# Patient Record
Sex: MALE | Race: WHITE | NOT HISPANIC OR LATINO | Employment: UNEMPLOYED | URBAN - METROPOLITAN AREA
[De-identification: names, ages, dates, MRNs, and addresses within clinical notes are randomized per-mention and may not be internally consistent; named-entity substitution may affect disease eponyms.]

---

## 2017-06-12 ENCOUNTER — TELEPHONE (OUTPATIENT)
Dept: CARDIOTHORACIC SURGERY | Facility: CLINIC | Age: 57
End: 2017-06-12

## 2017-06-12 NOTE — TELEPHONE ENCOUNTER
Received a referral from Dr. Reno in [place], [place] re: paratracheal cyst. When pt first presented, it was believed he had a Zenker's diverticulum. U/S of the neck on 4/20/17, CT neck on 4/26/17, and EGD performed on 5/26/17.   CT neck revealed a 3.8x4.3x3.7cm air cyst at the level of the thoracic inlet.   Spoke with Dr. Reno's nurse, Jacey, Dr. Espinosa is also requesting a CT chest. Pt can  CT neck and CT chest on CD's and we can see him after the CT chest is obtained. Pt has no insurance on file and will be a self pay.   Jacey will call me back once CT chest is set up.

## 2017-06-14 ENCOUNTER — TELEPHONE (OUTPATIENT)
Dept: CARDIOTHORACIC SURGERY | Facility: CLINIC | Age: 57
End: 2017-06-14

## 2017-06-14 NOTE — TELEPHONE ENCOUNTER
Jacey with Dr. Reno faxed reports of CT neck and chest. CT chest performed yesterday, 6/13/17. Attempted to reach the pt on both provided phone #'s. Left a vm on on home phone # [phone].  Once appt is scheduled, will notify Jacey of the scheduled appt. Will also request the pt brings both CT chest and neck on CD.

## 2017-06-16 ENCOUNTER — TELEPHONE (OUTPATIENT)
Dept: CARDIOTHORACIC SURGERY | Facility: CLINIC | Age: 57
End: 2017-06-16

## 2017-06-16 NOTE — TELEPHONE ENCOUNTER
Diya spoke with pt's mother in regards to scheduling pt's consult with Dr. Espinosa on 6/21/17. Mailed appt slip along with campus map. Left a VM with Dr. Reno's nurse, Jacey ([phone]), to notify her of the scheduled appt.

## 2017-06-21 ENCOUNTER — OFFICE VISIT (OUTPATIENT)
Dept: CARDIOTHORACIC SURGERY | Facility: CLINIC | Age: 57
End: 2017-06-21
Payer: MEDICAID

## 2017-06-21 ENCOUNTER — TELEPHONE (OUTPATIENT)
Dept: GASTROENTEROLOGY | Facility: CLINIC | Age: 57
End: 2017-06-21

## 2017-06-21 ENCOUNTER — TELEPHONE (OUTPATIENT)
Dept: ENDOSCOPY | Facility: HOSPITAL | Age: 57
End: 2017-06-21

## 2017-06-21 VITALS
OXYGEN SATURATION: 98 % | BODY MASS INDEX: 19.16 KG/M2 | DIASTOLIC BLOOD PRESSURE: 77 MMHG | SYSTOLIC BLOOD PRESSURE: 129 MMHG | WEIGHT: 133.81 LBS | HEIGHT: 70 IN | HEART RATE: 118 BPM

## 2017-06-21 DIAGNOSIS — R63.4 WEIGHT LOSS, ABNORMAL: ICD-10-CM

## 2017-06-21 DIAGNOSIS — C15.9 MALIGNANT NEOPLASM OF ESOPHAGUS, UNSPECIFIED LOCATION: Primary | ICD-10-CM

## 2017-06-21 DIAGNOSIS — F17.210 CIGARETTE SMOKER: ICD-10-CM

## 2017-06-21 DIAGNOSIS — J98.59 MEDIASTINAL MASS: ICD-10-CM

## 2017-06-21 DIAGNOSIS — R13.19 ESOPHAGEAL DYSPHAGIA: Primary | ICD-10-CM

## 2017-06-21 DIAGNOSIS — R91.8 HILAR MASS: ICD-10-CM

## 2017-06-21 PROCEDURE — 99213 OFFICE O/P EST LOW 20 MIN: CPT | Mod: PBBFAC | Performed by: THORACIC SURGERY (CARDIOTHORACIC VASCULAR SURGERY)

## 2017-06-21 PROCEDURE — 99204 OFFICE O/P NEW MOD 45 MIN: CPT | Mod: S$PBB,,, | Performed by: THORACIC SURGERY (CARDIOTHORACIC VASCULAR SURGERY)

## 2017-06-21 PROCEDURE — 99999 PR PBB SHADOW E&M-EST. PATIENT-LVL III: CPT | Mod: PBBFAC,,, | Performed by: THORACIC SURGERY (CARDIOTHORACIC VASCULAR SURGERY)

## 2017-06-21 RX ORDER — LISINOPRIL 10 MG/1
10 TABLET ORAL EVERY MORNING
COMMUNITY

## 2017-06-21 RX ORDER — HYDROCODONE BITARTRATE AND ACETAMINOPHEN 7.5; 325 MG/1; MG/1
1 TABLET ORAL EVERY 6 HOURS PRN
Status: ON HOLD | COMMUNITY
End: 2017-06-22 | Stop reason: SDUPTHER

## 2017-06-21 NOTE — LETTER
June 21, 2017        Kingsley Reno MD  [address]  [place] [address]             [facility]  [facility]  [place] [address]  Phone: [phone]  Fax: [phone]   Patient: Segundo Jacobo   MR Number: [id number]   YOB: 1960   Date of Visit: 6/21/2017       Dear Dr. Reno:    Dictation #1  MRN:[id number]  CSN:[id number]      Sincerely,      Anderson Espinosa MD            CC  No Recipients    Enclosure

## 2017-06-21 NOTE — TELEPHONE ENCOUNTER
Spoke with patient and mother. EGD with stent/EUS scheduled for tomorrow 6/22 at 10:30a. It is an urgent add-on cleared by [facility]. Please call at [phone].

## 2017-06-21 NOTE — PROGRESS NOTES
Distress Screening Results: Psychosocial Distress screening score of Distress Score: 10 noted and reviewed. No intervention indicated.  Pt doesn't have a cancer diagnosis yet.
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
## 2017-06-22 ENCOUNTER — SURGERY (OUTPATIENT)
Age: 57
End: 2017-06-22

## 2017-06-22 ENCOUNTER — ANESTHESIA (OUTPATIENT)
Dept: ENDOSCOPY | Facility: HOSPITAL | Age: 57
End: 2017-06-22
Payer: MEDICAID

## 2017-06-22 ENCOUNTER — ANESTHESIA EVENT (OUTPATIENT)
Dept: ENDOSCOPY | Facility: HOSPITAL | Age: 57
End: 2017-06-22
Payer: MEDICAID

## 2017-06-22 ENCOUNTER — HOSPITAL ENCOUNTER (OUTPATIENT)
Facility: HOSPITAL | Age: 57
Discharge: HOME | End: 2017-06-22
Attending: INTERNAL MEDICINE | Admitting: INTERNAL MEDICINE
Payer: MEDICAID

## 2017-06-22 VITALS
OXYGEN SATURATION: 100 % | HEIGHT: 70 IN | WEIGHT: 132 LBS | HEART RATE: 88 BPM | DIASTOLIC BLOOD PRESSURE: 74 MMHG | SYSTOLIC BLOOD PRESSURE: 114 MMHG | BODY MASS INDEX: 18.9 KG/M2 | RESPIRATION RATE: 20 BRPM | TEMPERATURE: 99 F

## 2017-06-22 DIAGNOSIS — R13.10 DYSPHAGIA, UNSPECIFIED TYPE: Primary | ICD-10-CM

## 2017-06-22 DIAGNOSIS — R13.10 DYSPHAGIA: ICD-10-CM

## 2017-06-22 PROCEDURE — 43238 EGD US FINE NEEDLE BX/ASPIR: CPT | Mod: ,,, | Performed by: INTERNAL MEDICINE

## 2017-06-22 PROCEDURE — D9220A PRA ANESTHESIA: Mod: CRNA,,, | Performed by: NURSE ANESTHETIST, CERTIFIED REGISTERED

## 2017-06-22 PROCEDURE — 88342 IMHCHEM/IMCYTCHM 1ST ANTB: CPT | Mod: 26,,, | Performed by: PATHOLOGY

## 2017-06-22 PROCEDURE — 27202059 HC NEEDLE, FNA (ANY): Performed by: INTERNAL MEDICINE

## 2017-06-22 PROCEDURE — 25000003 PHARM REV CODE 250: Performed by: INTERNAL MEDICINE

## 2017-06-22 PROCEDURE — 88172 CYTP DX EVAL FNA 1ST EA SITE: CPT | Mod: 26,,, | Performed by: PATHOLOGY

## 2017-06-22 PROCEDURE — 63600175 PHARM REV CODE 636 W HCPCS: Performed by: NURSE ANESTHETIST, CERTIFIED REGISTERED

## 2017-06-22 PROCEDURE — 25000003 PHARM REV CODE 250: Performed by: NURSE ANESTHETIST, CERTIFIED REGISTERED

## 2017-06-22 PROCEDURE — D9220A PRA ANESTHESIA: Mod: ANES,,, | Performed by: ANESTHESIOLOGY

## 2017-06-22 PROCEDURE — 43238 EGD US FINE NEEDLE BX/ASPIR: CPT | Performed by: INTERNAL MEDICINE

## 2017-06-22 PROCEDURE — 37000008 HC ANESTHESIA 1ST 15 MINUTES: Performed by: INTERNAL MEDICINE

## 2017-06-22 PROCEDURE — 88173 CYTOPATH EVAL FNA REPORT: CPT | Mod: 26,,, | Performed by: PATHOLOGY

## 2017-06-22 PROCEDURE — 88305 TISSUE EXAM BY PATHOLOGIST: CPT | Performed by: PATHOLOGY

## 2017-06-22 PROCEDURE — 37000009 HC ANESTHESIA EA ADD 15 MINS: Performed by: INTERNAL MEDICINE

## 2017-06-22 RX ORDER — FENTANYL CITRATE 50 UG/ML
INJECTION, SOLUTION INTRAMUSCULAR; INTRAVENOUS
Status: DISCONTINUED | OUTPATIENT
Start: 2017-06-22 | End: 2017-06-22

## 2017-06-22 RX ORDER — MIDAZOLAM HYDROCHLORIDE 1 MG/ML
INJECTION, SOLUTION INTRAMUSCULAR; INTRAVENOUS
Status: DISCONTINUED | OUTPATIENT
Start: 2017-06-22 | End: 2017-06-22

## 2017-06-22 RX ORDER — OMEPRAZOLE 40 MG/1
40 CAPSULE, DELAYED RELEASE ORAL DAILY
Qty: 30 CAPSULE | Refills: 3 | Status: SHIPPED | OUTPATIENT
Start: 2017-06-22 | End: 2017-07-22

## 2017-06-22 RX ORDER — PROPOFOL 10 MG/ML
VIAL (ML) INTRAVENOUS
Status: DISCONTINUED | OUTPATIENT
Start: 2017-06-22 | End: 2017-06-22

## 2017-06-22 RX ORDER — KETAMINE HYDROCHLORIDE 100 MG/ML
INJECTION, SOLUTION INTRAMUSCULAR; INTRAVENOUS
Status: DISCONTINUED | OUTPATIENT
Start: 2017-06-22 | End: 2017-06-22

## 2017-06-22 RX ORDER — SUCCINYLCHOLINE CHLORIDE 20 MG/ML
INJECTION INTRAMUSCULAR; INTRAVENOUS
Status: DISCONTINUED | OUTPATIENT
Start: 2017-06-22 | End: 2017-06-22

## 2017-06-22 RX ORDER — HYDROCODONE BITARTRATE AND ACETAMINOPHEN 7.5; 325 MG/1; MG/1
1 TABLET ORAL EVERY 6 HOURS PRN
Qty: 120 TABLET | Refills: 0 | Status: SHIPPED | OUTPATIENT
Start: 2017-06-22 | End: 2017-07-22

## 2017-06-22 RX ORDER — SODIUM CHLORIDE 9 MG/ML
INJECTION, SOLUTION INTRAVENOUS CONTINUOUS
Status: DISCONTINUED | OUTPATIENT
Start: 2017-06-22 | End: 2017-06-22 | Stop reason: HOSPADM

## 2017-06-22 RX ORDER — LIDOCAINE HCL/PF 100 MG/5ML
SYRINGE (ML) INTRAVENOUS
Status: DISCONTINUED | OUTPATIENT
Start: 2017-06-22 | End: 2017-06-22

## 2017-06-22 RX ORDER — PHENYLEPHRINE HYDROCHLORIDE 10 MG/ML
INJECTION INTRAVENOUS
Status: DISCONTINUED | OUTPATIENT
Start: 2017-06-22 | End: 2017-06-22

## 2017-06-22 RX ORDER — ROCURONIUM BROMIDE 10 MG/ML
INJECTION, SOLUTION INTRAVENOUS
Status: DISCONTINUED | OUTPATIENT
Start: 2017-06-22 | End: 2017-06-22

## 2017-06-22 RX ADMIN — PHENYLEPHRINE HYDROCHLORIDE 200 MCG: 10 INJECTION INTRAVENOUS at 12:06

## 2017-06-22 RX ADMIN — MIDAZOLAM HYDROCHLORIDE 2 MG: 1 INJECTION, SOLUTION INTRAMUSCULAR; INTRAVENOUS at 12:06

## 2017-06-22 RX ADMIN — SUCCINYLCHOLINE CHLORIDE 60 MG: 20 INJECTION, SOLUTION INTRAMUSCULAR; INTRAVENOUS at 12:06

## 2017-06-22 RX ADMIN — FENTANYL CITRATE 50 MCG: 50 INJECTION, SOLUTION INTRAMUSCULAR; INTRAVENOUS at 12:06

## 2017-06-22 RX ADMIN — LIDOCAINE HYDROCHLORIDE 50 MG: 20 INJECTION, SOLUTION INTRAVENOUS at 12:06

## 2017-06-22 RX ADMIN — PROPOFOL 100 MG: 10 INJECTION, EMULSION INTRAVENOUS at 12:06

## 2017-06-22 RX ADMIN — KETAMINE HYDROCHLORIDE 40 MG: 100 INJECTION, SOLUTION, CONCENTRATE INTRAMUSCULAR; INTRAVENOUS at 12:06

## 2017-06-22 RX ADMIN — ROCURONIUM BROMIDE 5 MG: 10 INJECTION, SOLUTION INTRAVENOUS at 12:06

## 2017-06-22 RX ADMIN — SODIUM CHLORIDE: 0.9 INJECTION, SOLUTION INTRAVENOUS at 10:06

## 2017-06-22 NOTE — PLAN OF CARE
Problem: Patient Care Overview  Goal: Plan of Care Review  Outcome: Outcome(s) achieved Date Met: 06/22/17  Awake and alert. VSS. Denies pain or nausea. Tolerating liquids well. DC instructions given to patient and family and they verbalize understanding.

## 2017-06-22 NOTE — DISCHARGE SUMMARY
Discharge Summary/Instructions for after an Upper EUS    Patient Name: Segundo Jacobo  Patient MRN: [id number]  Patient YOB: 1960 Thursday, June 22, 2017  Jeremiah Powell MD    1.  Do Not eat or drink anything for 1 hour.  Try sips of water first.  If tolerated, resume your regular diet or one recommended by your physician.  2.  Do not drive, operate machinery, make critical decisions, or do activities that require coordination or balance for 24 hours.  3.  You may experience a sore throat for 24 to 48 hours.  You may use throat lozenges or gargle with warm salt water to relieve the discomfort.  4.  Because air was put into your stomach during the procedure, you may experience some belching.  5.  Go directly to the emergency room if you notice any of the following:     Chills and/or fever over 101 F   Persistent vomiting or vomiting with blood   Severe abdominal pain, other than gas cramps   Severe chest pain   Black, tarry stools    Your doctor recommends these additional instructions:    You are being discharged to home.   We are waiting for cytology results.   Do not take any aspirin, ibuprofen (including Advil, Motrin or Nuprin), naproxen (including Aleve), or any other non-steroidal anti-inflammatory drugs after your biopsy.   The findings and recommendations have been discussed with you.   Take Prilosec (omeprazole) 40 mg by mouth once a day.   Eat a mechanical soft diet.    If you have any questions on the above instructions, call the GI Lab and ask for an endoscopy nurse.    If you have any problems, please call your physician.  EMERGENCY PHONE NUMBER: [phone]  LAB RESULTS: [phone]

## 2017-06-22 NOTE — LETTER
"June 21, 2017    Kingsley Reno M.D.  [facility]    RE:  AUBREE FELIZ  [facility].:  [id number]    Dear Dr. Reno:    I had the pleasure of meeting Mr. Feliz in my office  today.  He presented today for evaluation of a  "tracheal cyst" as well as weight loss and dysphagia.   I reviewed his images, which demonstrate an air pocket  that appears to be in communication with his anterior  tracheal wall.  He also has a large mediastinal mass  with extension into his left hilum.    I strongly suspect that Mr. Feliz has either a primary  lung cancer with hilar and mediastinal extension or a  mid esophageal cancer.  I will arrange for him to  undergo an endoscopic ultrasound with biopsy.  I  suspect that it is markedly reduced oral intake and  poor nutritional status.  It has likely caused his  prior tracheotomy to recanalize.  He may ultimately  require a neck exploration and flap coverage.  However,  his poor nutritional status may compromise the success  of a flap coverage at this time.  I will ask our ENT  surgeons to evaluate him for possible flap coverage.   If ENT has a primary lung or esophageal malignancy, he  will likely require definitive chemotherapy and  radiation therapy.    Thank you for soliciting my input into Mr. Feliz's  care.    Sincerely,      DAVID/IN  dd: 06/21/2017 14:15:34 (CDT)  td: 06/22/2017 07:44:18 (CDT)  Doc ID   #[id number]  Job ID #[id number]    CC: Arnulfo DIAL M.D.  "

## 2017-06-22 NOTE — PATIENT INSTRUCTIONS
Discharge Summary/Instructions for after an Upper EUS  Patient Name: Segundo Jacobo  Patient MRN: [id number]  Patient YOB: 1960 Thursday, June 22, 2017  Jeremiah Powell MD  1.  Do Not eat or drink anything for 1 hour.  Try sips of water first.  If   tolerated, resume your regular diet or one recommended by your physician.  2.  Do not drive, operate machinery, make critical decisions, or do   activities that require coordination or balance for 24 hours.  3.  You may experience a sore throat for 24 to 48 hours.  You may use throat   lozenges or gargle with warm salt water to relieve the discomfort.  4.  Because air was put into your stomach during the procedure, you may   experience some belching.  5.  Go directly to the emergency room if you notice any of the following:   Chills and/or fever over 101 F   Persistent vomiting or vomiting with blood   Severe abdominal pain, other than gas cramps   Severe chest pain   Black, tarry stools  Your doctor recommends these additional instructions:  You are being discharged to home.   We are waiting for cytology results.   Do not take any aspirin, ibuprofen (including Advil, Motrin or Nuprin),   naproxen (including Aleve), or any other non-steroidal anti-inflammatory   drugs after your biopsy.   The findings and recommendations have been discussed with you.   Take Prilosec (omeprazole) 40 mg by mouth once a day.   Eat a mechanical soft diet.  If you have any questions on the above instructions, call the GI Lab and ask   for an endoscopy nurse.  If you have any problems, please call your physician.  EMERGENCY PHONE NUMBER: [phone]  LAB RESULTS: [phone]  Jeremiah Powell MD  6/22/2017 2:17:15 PM  This report has been verified and signed electronically.

## 2017-06-22 NOTE — H&P
History & Physical - Short Stay  Gastroenterology      SUBJECTIVE:     Procedure: EGD and EUS    Chief Complaint/Indication for Procedure: Dysphagia    History of Present Illness:  Patient is a 56 y.o. male presents with dysphagia to solid and weight loss. CT scan showed evidence of a subcarinal mass. It is unclear if originate form the esophagus. The patient is here for EUS with FNA and possible esophageal stenting.     PTA Medications   Medication Sig    hydrocodone-acetaminophen 7.5-325mg (NORCO) 7.5-325 mg per tablet Take 1 tablet by mouth every 6 (six) hours as needed for Pain.    lisinopril 10 MG tablet Take 10 mg by mouth every morning.        Review of patient's allergies indicates:  No Known Allergies     History reviewed. No pertinent past medical history.  History reviewed. No pertinent surgical history.  History reviewed. No pertinent family history.  Social History   Substance Use Topics    Smoking status: Current Every Day Smoker     Packs/day: 0.50     Years: 30.00     Types: Cigarettes    Smokeless tobacco: Not on file    Alcohol use 8.4 oz/week     14 Cans of beer per week       Review of Systems:  Constitutional: positive for weight loss  Respiratory: no cough or shortness of breath  Cardiovascular: positive for chest pain  Gastrointestinal: positive for dysphagia and odynophagia    OBJECTIVE:     Vital Signs (Most Recent)  Temp: 97.6 °F (36.4 °C) (06/22/17 0956)  Pulse: 99 (06/22/17 0956)  Resp: 18 (06/22/17 0956)  BP: 127/67 (06/22/17 0956)  SpO2: 99 % (06/22/17 0956)    Physical Exam:  General: well developed, appears older than stated age  Lungs:  normal respiratory effort  Heart: regular rate, S1, S2 normal  Abdomen: soft, non-tender non-distented; bowel sounds normal; no masses,  no organomegaly    Laboratory  CBC: No results for input(s): WBC, RBC, HGB, HCT, PLT, MCV, MCH, MCHC in the last 168 hours.  CMP: No results for input(s): GLU, CALCIUM, ALBUMIN, PROT, NA, K, CO2, CL, BUN,  CREATININE, ALKPHOS, ALT, AST, BILITOT in the last 168 hours.  Coagulation: No results for input(s): LABPROT, INR, APTT in the last 168 hours.      Diagnostic Results:      ASSESSMENT/PLAN:     Dysphagia  Mediastinal vs esophageal mass    Plan: EGD, EUS and possible esophageal stent placement    Anesthesia Plan: General    ASA Grade: ASA 2 - Patient with mild systemic disease with no functional limitations      The impression and plan was discussed in detail with the patient and family. All questions have been answered and the patient voices understanding of our plan at this point. The risk of the procedure was discussed in detail which includes but not limited to bleeding, infection, perforation in some cases requiring surgery with its spectrum of complications.

## 2017-06-22 NOTE — TRANSFER OF CARE
"Anesthesia Transfer of Care Note    Patient: Segundo Jacobo    Procedure(s) Performed: Procedure(s) (LRB):  ESOPHAGOGASTRODUODENOSCOPY (EGD)/poss stent (N/A)  ULTRASOUND-ENDOSCOPIC-UPPER (N/A)    Patient location: PACU    Anesthesia Type: general    Transport from OR: Transported from OR on 6-10 L/min O2 by face mask with adequate spontaneous ventilation    Post pain: adequate analgesia    Post assessment: no apparent anesthetic complications    Post vital signs: stable    Level of consciousness: awake    Nausea/Vomiting: no nausea/vomiting    Complications: none    Transfer of care protocol was followed      Last vitals:   Visit Vitals  BP (!) 96/57 (BP Location: Right arm, Patient Position: Sitting, BP Method: Automatic)   Pulse 91   Temp 37 °C (98.6 °F) (Oral)   Resp 20   Ht 5' 10" (1.778 m)   Wt 59.9 kg (132 lb)   SpO2 98%   BMI 18.94 kg/m²     "

## 2017-06-22 NOTE — ANESTHESIA PREPROCEDURE EVALUATION
06/22/2017  Segundo Jacobo is a 56 y.o., male.    Anesthesia Evaluation    I have reviewed the Patient Summary Reports.    I have reviewed the Nursing Notes.      Review of Systems  Anesthesia Hx:  No problems with previous Anesthesia    Hematology/Oncology:  Hematology Normal   Oncology Normal     EENT/Dental:EENT/Dental Normal   Cardiovascular:  Cardiovascular Normal     Pulmonary:  Pulmonary Normal    Renal/:  Renal/ Normal     Hepatic/GI:  Hepatic/GI Normal Dysphagia   Musculoskeletal:  Musculoskeletal Normal    Neurological:  Neurology Normal    Endocrine:  Endocrine Normal    Dermatological:  Skin Normal    Psych:  Psychiatric Normal           Physical Exam  General:  Well nourished    Airway/Jaw/Neck:  Airway Findings: Mouth Opening: Normal Tongue: Normal  General Airway Assessment: Adult  Mallampati: II  TM Distance: Normal, at least 6 cm        Eyes/Ears/Nose:  EYES/EARS/NOSE FINDINGS: Normal   Dental:  Dental Findings: Upper Dentures   Chest/Lungs:  Chest/Lungs Clear    Heart/Vascular:  Heart Findings: Normal Heart murmur: negative Vascular Findings: Normal    Abdomen:  Abdomen Findings: Normal    Musculoskeletal:  Musculoskeletal Findings: Normal   Skin:  Skin Findings: Normal    Mental Status:  Mental Status Findings: Normal        Anesthesia Plan  Type of Anesthesia, risks & benefits discussed:  Anesthesia Type:  general  Patient's Preference:   Intra-op Monitoring Plan:   Intra-op Monitoring Plan Comments:   Post Op Pain Control Plan:   Post Op Pain Control Plan Comments:   Induction:   IV  Beta Blocker:  Patient is not currently on a Beta-Blocker (No further documentation required).       Informed Consent: Patient understands risks and agrees with Anesthesia plan.  Questions answered. Anesthesia consent signed with patient.  ASA Score: 2     Day of Surgery Review of History & Physical:     H&P update referred to the surgeon.     Anesthesia Plan Notes:   56M smoker, new dysphagia to solids/liquids, no N/V, high esophageal mass for EUS/EGD under GETA        Ready For Surgery From Anesthesia Perspective.

## 2017-06-22 NOTE — ANESTHESIA RELEASE NOTE
"Anesthesia Release from PACU Note    Patient: Segundo Jacobo    Procedure(s) Performed: Procedure(s) (LRB):  ESOPHAGOGASTRODUODENOSCOPY (EGD)/poss stent (N/A)  ULTRASOUND-ENDOSCOPIC-UPPER (N/A)    Anesthesia type: general    Post pain: Adequate analgesia    Post assessment: no apparent anesthetic complications    Last Vitals:   Visit Vitals  /74   Pulse 88   Temp 37 °C (98.6 °F) (Oral)   Resp 20   Ht 5' 10" (1.778 m)   Wt 59.9 kg (132 lb)   SpO2 100%   BMI 18.94 kg/m²       Post vital signs: stable    Level of consciousness: awake    Nausea/Vomiting: no nausea/no vomiting    Complications: none    Airway Patency: patent    Respiratory: unassisted    Cardiovascular: stable and blood pressure at baseline    Hydration: euvolemic  "

## 2017-06-22 NOTE — ANESTHESIA POSTPROCEDURE EVALUATION
"Anesthesia Post Evaluation    Patient: Segundo Jacobo    Procedure(s) Performed: Procedure(s) (LRB):  ESOPHAGOGASTRODUODENOSCOPY (EGD)/poss stent (N/A)  ULTRASOUND-ENDOSCOPIC-UPPER (N/A)    Final Anesthesia Type: general  Patient location during evaluation: PACU  Patient participation: Yes- Able to Participate  Level of consciousness: awake and alert  Pain management: adequate  Airway patency: patent  PONV status at discharge: No PONV  Anesthetic complications: no      Cardiovascular status: blood pressure returned to baseline  Respiratory status: unassisted, spontaneous ventilation and room air  Hydration status: euvolemic  Follow-up not needed.        Visit Vitals  /74   Pulse 88   Temp 37 °C (98.6 °F) (Oral)   Resp 20   Ht 5' 10" (1.778 m)   Wt 59.9 kg (132 lb)   SpO2 100%   BMI 18.94 kg/m²       Pain/Wing Score: Pain Assessment Performed: Yes (6/22/2017  2:50 PM)  Presence of Pain: denies (6/22/2017  2:50 PM)      "

## 2017-06-23 ENCOUNTER — DOCUMENTATION ONLY (OUTPATIENT)
Dept: CARDIOTHORACIC SURGERY | Facility: CLINIC | Age: 57
End: 2017-06-23

## 2017-06-23 NOTE — PROGRESS NOTES
Faxed pt's records to referring provider Dr. Reno at fax # [phone], confirmed fax # with his nurse Jacey.   Pt may require referral to local heme/onc pending pathology. Reached out to Dr. Lexie Wei ([phone]), she is agreeable to see the pt if he chooses to be seen in [place]. Will fax records to her  at fax #[phone] if pathology is positive.  Also reached out to [facility] heme/onc, spoke with Teresa (ph # [phone]), she is requesting records and path report to be faxed to her at fax # [phone] if the pathology comes back as positive.   Both facilities do accept pt's without medical insurance and pending [place] medicaid. Called [facility], insurance personnel is off today and advised to call back on Monday. Will await pathology report.

## 2017-06-28 ENCOUNTER — TELEPHONE (OUTPATIENT)
Dept: CARDIOTHORACIC SURGERY | Facility: CLINIC | Age: 57
End: 2017-06-28

## 2017-06-28 ENCOUNTER — TELEPHONE (OUTPATIENT)
Dept: GASTROENTEROLOGY | Facility: CLINIC | Age: 57
End: 2017-06-28

## 2017-06-28 ENCOUNTER — DOCUMENTATION ONLY (OUTPATIENT)
Dept: CARDIOTHORACIC SURGERY | Facility: CLINIC | Age: 57
End: 2017-06-28

## 2017-06-28 NOTE — TELEPHONE ENCOUNTER
----- Message from Jeremiah Powell MD sent at 6/28/2017 12:21 PM CDT -----  Patient informed about the FNA of the mediastinum positive for cancer. Please let Dr Espinosa office know. He need tos see Heme/Onc ASAP.

## 2017-06-28 NOTE — TELEPHONE ENCOUNTER
Patient's case was discussed in our thoracic tumor board conference, it was recommended to refer the pt to heme/onc close to the pt's home due to pathology confirmation of squamous cell lung cancer.   Dr. Powell provided the pt with the pathology from the EUS. Discussed heme/onc options with the pt and his family, he would prefer to be seen in [place], [place] by Dr. Lexie Wei. Faxed records including pathology report to her office at [phone]. Advised the pt that he will receive a call from her office to set up the appt.   Also faxed pathology report to pt's referring provider, Dr. Reno at fax #[phone].   Will work on acquiring pt's imaging in film library to mail to Dr. Wei.

## 2017-06-30 ENCOUNTER — TELEPHONE (OUTPATIENT)
Dept: CARDIOTHORACIC SURGERY | Facility: CLINIC | Age: 57
End: 2017-06-30

## 2017-06-30 NOTE — TELEPHONE ENCOUNTER
Spoke with pt's mother, she received CD's of pt's imaging that was mailed by radiology. Pt met with Dr. Wei yesterday at 4pm. A treatment plan is in place.

## 2017-07-03 NOTE — PATIENT CARE CONFERENCE
[facility]      THORACIC MULTIDISCIPLINARY TUMOR BOARD  PATIENT REVIEW FORM  ________________________________________________________________________    CLINIC #: [id number]  DATE: 06/28/2017    TUMOR SITE:   NSCLC    PRESENTER:   Dr. Espinosa    PATIENT SUMMARY:   57 y/o with air filled track vs.tracheal cyst and large subcarinal mass. 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 He was diagnosed with pneumonia. During work up, the left neck palpable outpouching was noted. Initially this was thought to be a  Zenker's diverticulum. Underwent EGD and esophagram, extrinsic compression of esophagus noted but no obvious lumen defect. Eventually chest CT was ordered which not only revealed air track near trachea but also large 4.4 x 5.8 subcarinal mass. Reports dysphagia with both solids and liquids. Endorses substernal chest pain when swallowing. 45 pounds weight loss in the past 6 months. Endorses confusion, short term memory loss and gait disturbance. Current 1/2 ppd smoker, 15 pack year history.     EUS performed on 6/22/17 revealed A large, submucosal mass with no bleeding and stigmata of recent bleeding (small umbilication with friability) was found in the middle third of the esophagus, 30 to 38 cm from the incisors. The mass was partially obstructing and not circumferential. A single small submucosal nodule with a localized distribution was found in the upper third of the esophagus, 25cm from the incisors. A mass measuring 58 mm by 55 mm was identified endosonographically in the mediastinum. Fine needle aspiration performed. This was staged probable T4 (There was sonographic evidence suggesting invasion into the esophagus, pericardium (manifested by interface loss less than 15 mm), the was possible pericardium intraluminal growth and the descending aorta(manifested by abutment). This staging applied if malignancy  confirm.    BOARD RECOMMENDATIONS:   EUS pathology is positive for squamous cell carcinoma, refer to heme/onc.    CONSULT NEEDED:     [] Surgery    [] Hem/Onc    [x] Rad/Onc    [] Dietary                 [] Social Service    [] Psychology       [] Pulmonology    Clinical Stage: Tumor  Node(s)  Metastasis   Pathologic Stage: Tumor  Node(s)  Metastasis       GROUP STAGE:     [] O    [] 1A    [] IB    [] IIA    [] IIB     [] IIIA     [] IIIB     [] IIIC    [] IV                               [] Local recurrence     [] Regional recurrence     [] Distant recurrence                   [x] NSCLC     [] SCLC     Tumor type: Squamous     Unstageable:      [] Yes     [] No  Metastatic site(s):          [x] [facility] Treatment Guidelines reviewed and care planned is consistent with guidelines.         (i.e., NCCN, NCI, PD, ACO, AUA, etc.)    PRESENTATION AT CANCER CONFERENCE:         [] Prospective    [] Retrospective     [] Follow-Up          [] Eligible for clinical trial